# Patient Record
Sex: MALE | Race: ASIAN | NOT HISPANIC OR LATINO
[De-identification: names, ages, dates, MRNs, and addresses within clinical notes are randomized per-mention and may not be internally consistent; named-entity substitution may affect disease eponyms.]

---

## 2022-01-18 ENCOUNTER — APPOINTMENT (OUTPATIENT)
Dept: PULMONOLOGY | Facility: CLINIC | Age: 82
End: 2022-01-18
Payer: SELF-PAY

## 2022-01-18 ENCOUNTER — NON-APPOINTMENT (OUTPATIENT)
Age: 82
End: 2022-01-18

## 2022-01-18 VITALS
HEART RATE: 56 BPM | TEMPERATURE: 97.9 F | SYSTOLIC BLOOD PRESSURE: 112 MMHG | DIASTOLIC BLOOD PRESSURE: 66 MMHG | OXYGEN SATURATION: 95 %

## 2022-01-18 DIAGNOSIS — U07.1 COVID-19: ICD-10-CM

## 2022-01-18 PROBLEM — Z00.00 ENCOUNTER FOR PREVENTIVE HEALTH EXAMINATION: Status: ACTIVE | Noted: 2022-01-18

## 2022-01-18 PROCEDURE — 94618 PULMONARY STRESS TESTING: CPT

## 2022-01-18 PROCEDURE — 99204 OFFICE O/P NEW MOD 45 MIN: CPT | Mod: 25

## 2022-01-18 NOTE — PROCEDURE
[FreeTextEntry1] : exercise oximetry: no significant O2 desat with 6 min of walking on room air\par \par pt showed me his cxr results on his [facility] pt portal from when he was hospitalized--no evidence of covid pna

## 2022-01-18 NOTE — HISTORY OF PRESENT ILLNESS
[Former] : former [TextBox_4] : 81M with asthma on prn albuterol\par \par recent admit to [facility] for COVID early Jan\par admitted for 3-4 days\par required bipap initially for sats in 70's\par reports he did not get any specific covid meds \par discharged home without O2\par feeling fine now\par no sob/cp/cough/wheeze\par \par former cigarettes in teenage years\par currently smokes cigars from time to time

## 2022-01-19 RX ORDER — ALBUTEROL SULFATE 2.5 MG/3ML
(2.5 MG/3ML) SOLUTION RESPIRATORY (INHALATION) 4 TIMES DAILY
Qty: 1 | Refills: 5 | Status: ACTIVE | COMMUNITY
Start: 2022-01-19 | End: 1900-01-01